# Patient Record
Sex: FEMALE
[De-identification: names, ages, dates, MRNs, and addresses within clinical notes are randomized per-mention and may not be internally consistent; named-entity substitution may affect disease eponyms.]

---

## 2021-08-18 ENCOUNTER — TRANSCRIPTION ENCOUNTER (OUTPATIENT)
Age: 71
End: 2021-08-18

## 2024-09-08 ENCOUNTER — NON-APPOINTMENT (OUTPATIENT)
Age: 74
End: 2024-09-08

## 2024-09-09 ENCOUNTER — EMERGENCY (EMERGENCY)
Facility: HOSPITAL | Age: 74
LOS: 1 days | Discharge: HOME | End: 2024-09-09
Attending: EMERGENCY MEDICINE | Admitting: EMERGENCY MEDICINE
Payer: COMMERCIAL

## 2024-09-09 VITALS
DIASTOLIC BLOOD PRESSURE: 84 MMHG | SYSTOLIC BLOOD PRESSURE: 128 MMHG | RESPIRATION RATE: 18 BRPM | OXYGEN SATURATION: 97 % | HEART RATE: 89 BPM

## 2024-09-09 VITALS
WEIGHT: 160.06 LBS | OXYGEN SATURATION: 96 % | HEIGHT: 66 IN | SYSTOLIC BLOOD PRESSURE: 138 MMHG | HEART RATE: 109 BPM | TEMPERATURE: 98 F | DIASTOLIC BLOOD PRESSURE: 89 MMHG | RESPIRATION RATE: 18 BRPM

## 2024-09-09 PROCEDURE — 93971 EXTREMITY STUDY: CPT | Mod: 26,LT

## 2024-09-09 PROCEDURE — 99284 EMERGENCY DEPT VISIT MOD MDM: CPT

## 2024-09-09 PROCEDURE — 73610 X-RAY EXAM OF ANKLE: CPT

## 2024-09-09 PROCEDURE — 73610 X-RAY EXAM OF ANKLE: CPT | Mod: 26,LT

## 2024-09-09 PROCEDURE — 73562 X-RAY EXAM OF KNEE 3: CPT | Mod: 26,LT

## 2024-09-09 PROCEDURE — 99284 EMERGENCY DEPT VISIT MOD MDM: CPT | Mod: 25

## 2024-09-09 PROCEDURE — 73562 X-RAY EXAM OF KNEE 3: CPT

## 2024-09-09 PROCEDURE — 93971 EXTREMITY STUDY: CPT

## 2024-09-09 RX ORDER — ACETAMINOPHEN 325 MG/1
650 TABLET ORAL ONCE
Refills: 0 | Status: COMPLETED | OUTPATIENT
Start: 2024-09-09 | End: 2024-09-09

## 2024-09-09 RX ADMIN — ACETAMINOPHEN 650 MILLIGRAM(S): 325 TABLET ORAL at 22:19

## 2024-09-09 RX ADMIN — ACETAMINOPHEN 650 MILLIGRAM(S): 325 TABLET ORAL at 22:49

## 2024-09-09 NOTE — ED PROVIDER NOTE - PROGRESS NOTE DETAILS
Patient signed out pending results of duplex.  It was negative for DVT.  Per signout plan patient will be discharged at this time and can follow-up as an outpatient.

## 2024-09-09 NOTE — ED PROVIDER NOTE - OBJECTIVE STATEMENT
73-year-old female presents to the emergency department for evaluation for left lower extremity swelling and pain.  Patient states that she sustained a fall where she landed directly onto her left knee.  Patient has been increasing difficulty with ambulating since the fall 1 week ago.  Patient states the swelling only got worse with bruising.  Patient does report not only did she fall onto her knee but she twisted her left ankle and patient also has left ankle pain.  She finally went to urgent care today and they assessed her and told her to come to the emergency department because they did not have x-ray available and they also would like for her to get an ultrasound given the level of swelling that she has.  No anticoagulants.  No head injury.  No other complaints reported.  Last dose Tylenol was at noon.  Patient states she does not require medication at this time for pain.

## 2024-09-09 NOTE — ED PROVIDER NOTE - CARE PLAN
1 Principal Discharge DX:	Swelling of joint of left knee  Secondary Diagnosis:	Left ankle swelling

## 2024-09-09 NOTE — ED PROVIDER NOTE - PATIENT PORTAL LINK FT
You can access the FollowMyHealth Patient Portal offered by [facility] by registering at the following website: [email or web address]. By joining "[employer]’s FollowMyHealth portal, you will also be able to view your health information using other applications (apps) compatible with our system.

## 2024-09-09 NOTE — ED ADULT TRIAGE NOTE - CHIEF COMPLAINT QUOTE
Pt stated sent in from Urgent care, pt s/p fall x 1 week ago, c/o left knee/leg/foot pain with swelling

## 2024-09-09 NOTE — ED ADULT NURSE NOTE - OBJECTIVE STATEMENT
PAtient  sent in from Urgent care, pt s/p fall x 1 week ago with  left knee/leg/foot pain with swelling. Alert and oriented x 4. No nausea, vomiting, dizziness or SOB,

## 2024-09-09 NOTE — ED PROVIDER NOTE - PHYSICAL EXAMINATION
Gen: Well appearing in NAD  Head: NC/AT  Neck: trachea midline  Resp:  No distress  Ext: no deformities, left knee and ankle with local hematoma and tenderness, Calf tenderness with palpation.   Neuro:  A&O appears non focal  Skin:  Warm and dry as visualized  Psych:  Normal affect and mood

## 2024-09-10 NOTE — CHART NOTE - NSCHARTNOTEFT_GEN_A_CORE
73-year-old female presents to the emergency department for evaluation for left lower extremity swelling and pain as per chart.  ED provider recommended orthopedic follow up appointment.  SW called to assist with scheduling the orthopedic appointment and spoke with patient.

## 2024-09-10 NOTE — ED POST DISCHARGE NOTE - ADDITIONAL DOCUMENTATION
patient called back around 3 PM today, informed of left fibula fracture, recommended left knee immobilizer and a cane, and informed  Keri to schedule her an orthopedic appointment this week.  Patient preferred to use a wooden oar instead of a cane for support and ambulation.

## 2024-09-11 NOTE — DISCUSSION/SUMMARY
[de-identified] : 73-year-old woman with a left proximal fibula fracture, approximately 3 days out.  -The risks and benefits of operative versus nonoperative management were discussed at length with the patient. The patient shows a good understanding of the injury and treatment options. They would like to move forward with ~  ~ .  - - - - -Follow-up in -All of the patient's questions and concerns were addressed.

## 2024-09-11 NOTE — PHYSICAL EXAM
[de-identified] : The patient is sitting comfortably in the exam room.  LEFT leg.  -Skin is intact, no swelling, no ecchymosis -Range of motion  -Negative Lachman, negative anterior drawer, negative posterior drawer  -Negative Merrill  -Sensation is intact L1-S1  -5/5 EHL, FHL, TA, GS, quadriceps, hamstrings  -Foot is warm and well-perfused, palpable dorsalis pedis pulse  [de-identified] : ACC: [id number] EXAM: XR ANKLE COMP MIN 3 VIEWS LT ORDERED BY: [facility]  ACC: [id number] EXAM: XR KNEE AP LAT OBL 3 VIEWS LT ORDERED BY: [facility]  PROCEDURE DATE: 09/09/2024  INTERPRETATION: Clinical history: 73-year-old female, fall.  Three views of the left knee and left ankle.  FINDINGS: Minimally displaced fracture of the fibular proximal fibula. Mild to moderate degenerative change otherwise evident.  No suprapatellar effusion.  Moderate circumferential subcutaneous soft tissue.  IMPRESSION: Oblique, minimally displaced fracture of the proximal fibula. Findings discussed with Dr. Vargas at 1240 on 9/10/2024  --- End of Report ---  JARRET BROOKS DO; Attending Radiologist This document has been electronically signed. Sep 10 2024 12:42PM

## 2024-09-11 NOTE — HISTORY OF PRESENT ILLNESS
[de-identified] : Ms. JAMISON MÁRQUEZ is a 73 year old woman presents today for initial evaluation of a left knee injury sustained on 9/9/24. She was seen at [facility] where x-rays were performed.

## 2024-09-11 NOTE — DISCUSSION/SUMMARY
[de-identified] : 73-year-old woman with a left proximal fibula fracture, approximately 3 days out.  -The risks and benefits of operative versus nonoperative management were discussed at length with the patient. The patient shows a good understanding of the injury and treatment options. They would like to move forward with ~  ~ .  - - - - -Follow-up in -All of the patient's questions and concerns were addressed.

## 2024-09-11 NOTE — HISTORY OF PRESENT ILLNESS
[de-identified] : Ms. JAMISON MÁRQUEZ is a 73 year old woman presents today for initial evaluation of a left knee injury sustained on 9/9/24. She was seen at [facility] where x-rays were performed.

## 2024-09-11 NOTE — PHYSICAL EXAM
[de-identified] : The patient is sitting comfortably in the exam room.  LEFT leg.  -Skin is intact, no swelling, no ecchymosis -Range of motion  -Negative Lachman, negative anterior drawer, negative posterior drawer  -Negative Merrill  -Sensation is intact L1-S1  -5/5 EHL, FHL, TA, GS, quadriceps, hamstrings  -Foot is warm and well-perfused, palpable dorsalis pedis pulse  [de-identified] : ACC: [id number] EXAM: XR ANKLE COMP MIN 3 VIEWS LT ORDERED BY: [facility]  ACC: [id number] EXAM: XR KNEE AP LAT OBL 3 VIEWS LT ORDERED BY: [facility]  PROCEDURE DATE: 09/09/2024  INTERPRETATION: Clinical history: 73-year-old female, fall.  Three views of the left knee and left ankle.  FINDINGS: Minimally displaced fracture of the fibular proximal fibula. Mild to moderate degenerative change otherwise evident.  No suprapatellar effusion.  Moderate circumferential subcutaneous soft tissue.  IMPRESSION: Oblique, minimally displaced fracture of the proximal fibula. Findings discussed with Dr. Vargas at 1240 on 9/10/2024  --- End of Report ---  JARRET BROOKS DO; Attending Radiologist This document has been electronically signed. Sep 10 2024 12:42PM

## 2024-09-11 NOTE — PHYSICAL EXAM
[de-identified] : The patient is sitting comfortably in the exam room.  LEFT leg.  -Skin is intact, no swelling, no ecchymosis -Range of motion  -Negative Lachman, negative anterior drawer, negative posterior drawer  -Negative Merrill  -Sensation is intact L1-S1  -5/5 EHL, FHL, TA, GS, quadriceps, hamstrings  -Foot is warm and well-perfused, palpable dorsalis pedis pulse  [de-identified] : ACC: [id number] EXAM: XR ANKLE COMP MIN 3 VIEWS LT ORDERED BY: [facility]  ACC: [id number] EXAM: XR KNEE AP LAT OBL 3 VIEWS LT ORDERED BY: [facility]  PROCEDURE DATE: 09/09/2024  INTERPRETATION: Clinical history: 73-year-old female, fall.  Three views of the left knee and left ankle.  FINDINGS: Minimally displaced fracture of the fibular proximal fibula. Mild to moderate degenerative change otherwise evident.  No suprapatellar effusion.  Moderate circumferential subcutaneous soft tissue.  IMPRESSION: Oblique, minimally displaced fracture of the proximal fibula. Findings discussed with Dr. Vargas at 1240 on 9/10/2024  --- End of Report ---  JARRET BROOKS DO; Attending Radiologist This document has been electronically signed. Sep 10 2024 12:42PM

## 2024-09-11 NOTE — HISTORY OF PRESENT ILLNESS
[de-identified] : Ms. JAMISON MÁRQUEZ is a 73 year old woman presents today for initial evaluation of a left knee injury sustained on 9/9/24. She was seen at [facility] where x-rays were performed.

## 2024-09-11 NOTE — HISTORY OF PRESENT ILLNESS
[de-identified] : Ms. JAMISON MÁRQUEZ is a 73 year old woman presents today for initial evaluation of a left knee injury sustained on 9/9/24. She was seen at [facility] where x-rays were performed.

## 2024-09-11 NOTE — PHYSICAL EXAM
[de-identified] : The patient is sitting comfortably in the exam room.  LEFT leg.  -Skin is intact, no swelling, no ecchymosis -Range of motion  -Negative Lachman, negative anterior drawer, negative posterior drawer  -Negative Merrill  -Sensation is intact L1-S1  -5/5 EHL, FHL, TA, GS, quadriceps, hamstrings  -Foot is warm and well-perfused, palpable dorsalis pedis pulse  [de-identified] : ACC: [id number] EXAM: XR ANKLE COMP MIN 3 VIEWS LT ORDERED BY: [facility]  ACC: [id number] EXAM: XR KNEE AP LAT OBL 3 VIEWS LT ORDERED BY: [facility]  PROCEDURE DATE: 09/09/2024  INTERPRETATION: Clinical history: 73-year-old female, fall.  Three views of the left knee and left ankle.  FINDINGS: Minimally displaced fracture of the fibular proximal fibula. Mild to moderate degenerative change otherwise evident.  No suprapatellar effusion.  Moderate circumferential subcutaneous soft tissue.  IMPRESSION: Oblique, minimally displaced fracture of the proximal fibula. Findings discussed with Dr. Vargas at 1240 on 9/10/2024  --- End of Report ---  JARRET BROOKS DO; Attending Radiologist This document has been electronically signed. Sep 10 2024 12:42PM

## 2024-09-11 NOTE — DISCUSSION/SUMMARY
[de-identified] : 73-year-old woman with a left proximal fibula fracture, approximately 3 days out.  -The risks and benefits of operative versus nonoperative management were discussed at length with the patient. The patient shows a good understanding of the injury and treatment options. They would like to move forward with ~  ~ .  - - - - -Follow-up in -All of the patient's questions and concerns were addressed.

## 2024-09-12 ENCOUNTER — APPOINTMENT (OUTPATIENT)
Dept: ORTHOPEDIC SURGERY | Facility: CLINIC | Age: 74
End: 2024-09-12
Payer: COMMERCIAL

## 2024-09-12 VITALS — HEART RATE: 102 BPM | DIASTOLIC BLOOD PRESSURE: 82 MMHG | SYSTOLIC BLOOD PRESSURE: 131 MMHG

## 2024-09-12 VITALS
DIASTOLIC BLOOD PRESSURE: 85 MMHG | HEART RATE: 102 BPM | SYSTOLIC BLOOD PRESSURE: 145 MMHG | WEIGHT: 160 LBS | BODY MASS INDEX: 25.71 KG/M2 | HEIGHT: 66 IN

## 2024-09-12 DIAGNOSIS — S82.832A OTHER FRACTURE OF UPPER AND LOWER END OF LEFT FIBULA, INITIAL ENCOUNTER FOR CLOSED FRACTURE: ICD-10-CM

## 2024-09-12 PROCEDURE — 99204 OFFICE O/P NEW MOD 45 MIN: CPT

## 2024-09-12 RX ORDER — DICLOFENAC SODIUM 10 MG/G
1 GEL TOPICAL DAILY
Qty: 1 | Refills: 0 | Status: ACTIVE | COMMUNITY
Start: 2024-09-12 | End: 1900-01-01

## 2024-09-16 ENCOUNTER — TRANSCRIPTION ENCOUNTER (OUTPATIENT)
Age: 74
End: 2024-09-16

## 2024-10-09 ENCOUNTER — NON-APPOINTMENT (OUTPATIENT)
Age: 74
End: 2024-10-09

## 2024-10-10 ENCOUNTER — NON-APPOINTMENT (OUTPATIENT)
Age: 74
End: 2024-10-10

## 2024-10-10 ENCOUNTER — APPOINTMENT (OUTPATIENT)
Dept: ORTHOPEDIC SURGERY | Facility: CLINIC | Age: 74
End: 2024-10-10

## 2024-10-10 DIAGNOSIS — S82.832A OTHER FRACTURE OF UPPER AND LOWER END OF LEFT FIBULA, INITIAL ENCOUNTER FOR CLOSED FRACTURE: ICD-10-CM

## 2024-10-10 PROCEDURE — 99214 OFFICE O/P EST MOD 30 MIN: CPT

## 2024-10-10 PROCEDURE — 73562 X-RAY EXAM OF KNEE 3: CPT | Mod: LT

## 2024-10-10 PROCEDURE — 73610 X-RAY EXAM OF ANKLE: CPT | Mod: LT

## 2024-12-12 ENCOUNTER — APPOINTMENT (OUTPATIENT)
Dept: ORTHOPEDIC SURGERY | Facility: CLINIC | Age: 74
End: 2024-12-12

## 2024-12-12 VITALS — HEIGHT: 66 IN | WEIGHT: 160 LBS | BODY MASS INDEX: 25.71 KG/M2

## 2024-12-12 DIAGNOSIS — S82.832A OTHER FRACTURE OF UPPER AND LOWER END OF LEFT FIBULA, INITIAL ENCOUNTER FOR CLOSED FRACTURE: ICD-10-CM

## 2024-12-12 PROCEDURE — 99213 OFFICE O/P EST LOW 20 MIN: CPT

## 2024-12-12 PROCEDURE — 73562 X-RAY EXAM OF KNEE 3: CPT | Mod: LT

## 2024-12-12 PROCEDURE — 73610 X-RAY EXAM OF ANKLE: CPT | Mod: LT

## 2025-03-27 ENCOUNTER — APPOINTMENT (OUTPATIENT)
Dept: ORTHOPEDIC SURGERY | Facility: CLINIC | Age: 75
End: 2025-03-27
Payer: COMMERCIAL

## 2025-03-27 DIAGNOSIS — S82.832A OTHER FRACTURE OF UPPER AND LOWER END OF LEFT FIBULA, INITIAL ENCOUNTER FOR CLOSED FRACTURE: ICD-10-CM

## 2025-03-27 PROCEDURE — 73562 X-RAY EXAM OF KNEE 3: CPT | Mod: LT

## 2025-03-27 PROCEDURE — 73610 X-RAY EXAM OF ANKLE: CPT | Mod: LT

## 2025-03-27 PROCEDURE — 99213 OFFICE O/P EST LOW 20 MIN: CPT

## 2025-03-27 RX ORDER — DICLOFENAC SODIUM 3 G/100G
3 GEL TOPICAL TWICE DAILY
Qty: 1 | Refills: 1 | Status: ACTIVE | COMMUNITY
Start: 2025-03-27 | End: 1900-01-01

## 2025-05-17 NOTE — ED ADULT NURSE NOTE - CAS DISCH TRANSFER METHOD
Patient does not appear to be in any acute distress/shows no evidence of clinical instability at this time.     Provider has reviewed discharge instructions with the patient/family.  The patient/family verbalized understanding instructions as well as need for follow up for any further symptoms.     Discharge papers given, education provided, and any questions answered. Patient discharged by provider.     Private car